# Patient Record
Sex: FEMALE | Race: WHITE | NOT HISPANIC OR LATINO
[De-identification: names, ages, dates, MRNs, and addresses within clinical notes are randomized per-mention and may not be internally consistent; named-entity substitution may affect disease eponyms.]

---

## 2023-03-31 PROBLEM — Z00.00 ENCOUNTER FOR PREVENTIVE HEALTH EXAMINATION: Status: ACTIVE | Noted: 2023-03-31

## 2023-04-03 ENCOUNTER — APPOINTMENT (OUTPATIENT)
Dept: OTOLARYNGOLOGY | Facility: CLINIC | Age: 28
End: 2023-04-03
Payer: COMMERCIAL

## 2023-04-03 VITALS — BODY MASS INDEX: 25.4 KG/M2 | HEIGHT: 62 IN | WEIGHT: 138 LBS

## 2023-04-03 PROCEDURE — 31231 NASAL ENDOSCOPY DX: CPT

## 2023-04-03 PROCEDURE — 99203 OFFICE O/P NEW LOW 30 MIN: CPT | Mod: 25
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
## 2023-04-03 NOTE — PHYSICAL EXAM
[de-identified] : C- shaped dorsum [Midline] : trachea located in midline position [de-identified] : midline septum\par depressed nasal bone on left\par mid-vault to right [Normal] : no rashes

## 2023-04-03 NOTE — PROCEDURE
[FreeTextEntry1] : Nasal endoscopy [FreeTextEntry2] : Nasal fracture [FreeTextEntry3] : Procedure: nasal endoscopy \par \par Pre-operative diagnosis: \par \par Indication: Anterior rhinoscopy insufficient to diagnose pathology\par \par Details:\par After decongestant and lidocaine was sprayed in the bilateral nasal cavities, a flexible laryngoscope was inserted into the right nares. The nasal cavity, middle meatus, ETO, nasopharynx, and glottis were visualized. The endoscope was then inserted into the left nares and the nasal cavity, middle meatus, and ETO was visualized. The patient tolerated procedure well.\par \par Findings:\par midline septum\par mild turbinate hypertrophy
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
## 2023-05-13 ENCOUNTER — TRANSCRIPTION ENCOUNTER (OUTPATIENT)
Age: 28
End: 2023-05-13

## 2023-10-02 ENCOUNTER — APPOINTMENT (OUTPATIENT)
Dept: OTOLARYNGOLOGY | Facility: CLINIC | Age: 28
End: 2023-10-02